# Patient Record
Sex: MALE | Race: AMERICAN INDIAN OR ALASKA NATIVE
[De-identification: names, ages, dates, MRNs, and addresses within clinical notes are randomized per-mention and may not be internally consistent; named-entity substitution may affect disease eponyms.]

---

## 2017-09-07 NOTE — EMERGENCY DEPARTMENT REPORT
ED Lower Extremity HPI





- General


Chief Complaint: Extremity Injury, Lower


Stated Complaint: FOOT PAIN


Time Seen by Provider: 09/07/17 17:24


Source: patient, EMS


Mode of arrival: Wheelchair


Limitations: Physical Limitation





- History of Present Illness


Initial Comments: 


pt is a 68 y/o mail with hx of Asthma, CHF, COPD, DMII, and HIV pos who 

presents for left ankle pain and swelling s/p glf 3 days ago pt endorses unable 

to ambulate , pt does indorse hx of gout, pt advises that he call the ambulance 

to hospital today because he can no longer walk on ankle, pain is 8/10 aching 

throbbing tingling. 





MD Complaint: ankle injury


Onset/Timing: 3


-: days(s)


Injury: Leg: Left (left lateral lower leg pain swelling no ecchymosis no 

stepoff no ), Ankle: Left (left lateral ankle pain and swelling )


Type of Injury: other (fall Ground Level )


Place: home


Severity: moderate


Severity scale (0 -10): 7


Improves With: nothing


Worsens With: weight bearing, movement, palpation


Context: fall


Associated Symptoms: swelling, tingling, unable to bear weight





- Related Data


 Previous Rx's











 Medication  Instructions  Recorded  Last Taken  Type


 


Aspirin [Aspirin TAB] 325 mg PO QDAY #30 tablet 05/23/14 Unknown Rx


 


metFORMIN [Glucophage] 500 mg PO DAILY #30 tablet 05/23/14 Unknown Rx


 


Acetaminophen/Codeine [Tylenol 1 tab PO Q6H PRN #30 tab 09/07/17 Unknown Rx





/Codeine # 3 tab]    











 Allergies











Allergy/AdvReac Type Severity Reaction Status Date / Time


 


No Known Allergies Allergy   Verified 09/07/17 15:02














ED Review of Systems


ROS: 


Stated complaint: FOOT PAIN


Other details as noted in HPI





Constitutional: denies: chills, fever


Eyes: denies: eye pain, eye discharge, vision change


ENT: denies: ear pain, throat pain


Respiratory: denies: cough, shortness of breath, wheezing


Cardiovascular: denies: chest pain, palpitations


Endocrine: no symptoms reported


Gastrointestinal: denies: abdominal pain, nausea, diarrhea


Genitourinary: denies: urgency, dysuria


Musculoskeletal: joint swelling, arthralgia.  denies: back pain, myalgia


Skin: denies: rash, lesions


Neurological: denies: headache, weakness, paresthesias


Psychiatric: denies: anxiety, depression


Hematological/Lymphatic: denies: easy bleeding, easy bruising





ED Past Medical Hx





- Past Medical History


Hx Congestive Heart Failure: No


Hx Diabetes: Yes


Hx Asthma: No


Hx COPD: No


Hx HIV: No





- Social History


Smoking Status: Never Smoker


Substance Use Type: None





- Medications


Home Medications: 


 Home Medications











 Medication  Instructions  Recorded  Confirmed  Last Taken  Type


 


Aspirin [Aspirin TAB] 325 mg PO QDAY #30 tablet 05/23/14  Unknown Rx


 


metFORMIN [Glucophage] 500 mg PO DAILY #30 tablet 05/23/14  Unknown Rx


 


Acetaminophen/Codeine [Tylenol 1 tab PO Q6H PRN #30 tab 09/07/17  Unknown Rx





/Codeine # 3 tab]     














ED Physical Exam





- General


Limitations: Physical Limitation


General appearance: alert, in no apparent distress





- Head


Head exam: Present: atraumatic, normocephalic





- Eye


Eye exam: Present: normal appearance, PERRL, EOMI





- ENT


ENT exam: Present: mucous membranes moist





- Neck


Neck exam: Present: normal inspection





- Respiratory


Respiratory exam: Present: normal lung sounds bilaterally.  Absent: respiratory 

distress





- Cardiovascular


Cardiovascular Exam: Present: regular rate, normal rhythm.  Absent: systolic 

murmur, diastolic murmur, rubs, gallop





- GI/Abdominal


GI/Abdominal exam: Present: soft, normal bowel sounds





- Rectal


Rectal exam: Present: deferred





- Extremities Exam


Extremities exam: Present: normal inspection, tenderness, normal capillary 

refill.  Absent: calf tenderness





- Expanded Lower Extremity Exam


  ** Left


Hip exam: Present: normal inspection, full ROM


Upper Leg exam: Present: normal inspection, full ROM


Knee exam: Present: normal inspection, full ROM


Lower Leg exam: Present: tenderness, swelling.  Absent: abrasion, laceration, 

ecchymosis, deformity, crepidus, dislocation, erythema, palpable cord, Wesley's 

sign


Ankle exam: Present: tenderness, swelling, abrasion.  Absent: laceration, 

ecchymosis, deformity, crepidus, dislocation, erythema, anterior draw sign


Foot/Toe exam: Present: normal inspection


Neuro vascular tendon exam: Present: no vascular compromise.  Absent: pulse 

deficit, abnormal cap refill, motor deficit, sensory deficit, tendon deficit, 

extremity cold to touch, pallor, abnormal 2-point discrimination, decreased fine

/light touch, foot drop, peroneal nerve deficit, significant pain with passive 

ROM of distal joint


Gait: Positive: unable to bear weight





- Back Exam


Back exam: Present: normal inspection, full ROM.  Absent: tenderness, CVA 

tenderness (R), CVA tenderness (L), muscle spasm, paraspinal tenderness, 

vertebral tenderness, rash noted





- Neurological Exam


Neurological exam: Present: alert, oriented X3, CN II-XII intact, reflexes 

normal





- Psychiatric


Psychiatric exam: Present: normal affect, normal mood





- Skin


Skin exam: Present: warm, dry, intact, normal color.  Absent: rash





ED Course


 Vital Signs











  09/07/17 09/07/17





  15:02 17:59


 


Temperature 98.6 F 


 


Pulse Rate 99 H 


 


Respiratory 20 18





Rate  


 


Blood Pressure 166/103 


 


O2 Sat by Pulse 100 





Oximetry  














ED Lower Extremity MDM





- Medical Decision Making


pt is a 68 y/o mail with hx of Asthma, CHF, COPD, DMII, and HIV pos who 

presents for left ankle pain and swelling s/p glf 3 days ago pt endorses unable 

to ambulate , pt does indorse hx of gout, pt advises that he call the ambulance 

to hospital today because he can no longer walk on ankle, pain is 8/10 aching 

throbbing tingling. exam: left lateral lower leg mild swelling no ecchmosis no 

deformity, pt unable to bear weight PPEPB+2, CRt <2 sec, negative homans, xray 

: closed nondisplaced fibula shaft fracture , plan: tatiana short leg left, 

crutches and crutch teacing pt will follow up with [facility]. [facility] will 

call tomorrow to set appointment, pt verbalized agreement and understanding 

with same.  pt did presents with htn episode today advises that he has not had 

bp medication today pt advised to take all medications as prescribed and to 

take medication upon arrival to home, pt verbalized agreement and understanding 

of same.  pt denies headache no dizziness no lightheadedness no cp no sob,  

post splint check: post splint check spacing appropriate 2 finger check, CRT <3 

sec, pt demonstrate understanding and safety with crutches will arrange 

nonemergent transport to home, for assistance with getting into residence.  pt 

has family to assist with follow up appointment. pt is a/ox 3 with nad at this 

time.      





Critical care attestation.: 


If time is entered above; I have spent that time in minutes in the direct care 

of this critically ill patient, excluding procedure time.








ED Disposition


Clinical Impression: 


Closed nondisp spiral fracture of shaft of fibula with routine healing


Qualifiers:


 Laterality: left Qualified Code(s): S82.445D - Nondisplaced spiral fracture of 

shaft of left fibula, subsequent encounter for closed fracture with routine 

healing





Disposition: DC-01 TO HOME OR SELFCARE


Is pt being admited?: No


Does the pt Need Aspirin: No


Condition: Good


Instructions:  Leg Fracture (ED)


Prescriptions: 


Acetaminophen/Codeine [Tylenol /Codeine # 3 tab] 1 tab PO Q6H PRN #30 tab


 PRN Reason: Pain


Referrals: 


PRIMARY CARE,MD [Primary Care Provider] - 3-5 Days


RAFAEL MCDONALD MD [Staff Physician] - 3-5 Days


Time of Disposition: 21:32

## 2017-09-08 NOTE — XRAY REPORT
LEFT ANKLE RADIOGRAPHS



INDICATION: Left ankle pain, swelling.  Status post fall.



COMPARISON: None similar.



FINDINGS: AP, lateral and oblique left ankle radiographs demonstrate 

distal fibular shaft spiral fracture, approximately 5 cm proximal to 

the lateral malleolus with minimal, approximately 1 mm cortical offset. 

 Intact ankle mortise, though comminuted fracture lucencies involve the 

medial malleolus, most prominent horizontal with approximately 3 mm 

thickness.  Diffuse ankle soft tissue swelling noted, medial more than 

lateral and also extending to the foot as also the lower leg.  Normal 

talar dome contour.  Approximately 5 mm plantar calcaneal spur.  Slight 

atherosclerotic calcifications.  Osteopenia not excluded.



CONCLUSION: Left distal fibular shaft spiral and medial malleolar acute 

fractures noted with diffuse soft tissue swelling and other findings, 

as described.  Intact ankle mortise.  Please correlate.



Thank you for the opportunity to participate in this patient's care.

## 2017-09-14 NOTE — EMERGENCY DEPARTMENT REPORT
HPI





- General


Chief Complaint: Extremity Injury, Lower


Time Seen by Provider: 09/14/17 22:15





- HPI


HPI: 





Room 9





Patient is a 67-year-old male presenting with a chief complaint left ankle 

pain.  The patient sustained a left distal fibula and medial malleolus fracture 

09/08/2017.  The patient was placed on a splint and given a referral to 

orthopedic surgery.  The patient states today while reaching above his head he 

lost his footing and slipped and fell again injuring his left lower extremity.  

The patient states the splint had gotten wet and so he removed it.  Patient 

denies any other complaints.  The patient currently gets his pain a score of 4/

10





Location: Left lower extremity


Duration: [see above]


Quality: Pain


Severity: 4/10


Modifying factors: [see above]


Context: [see above]


Mode of transportation: Unknown





ED Past Medical Hx





- Past Medical History


Previous Medical History?: Yes


Hx Diabetes: Yes





- Surgical History


Past Surgical History?: No





- Family History


Family history: no significant





- Social History


Smoking Status: Never Smoker


Substance Use Type: None





- Medications


Home Medications: 


 Home Medications











 Medication  Instructions  Recorded  Confirmed  Last Taken  Type


 


Aspirin [Aspirin TAB] 325 mg PO QDAY #30 tablet 05/23/14  Unknown Rx


 


metFORMIN [Glucophage] 500 mg PO DAILY #30 tablet 05/23/14  Unknown Rx


 


Acetaminophen/Codeine [Tylenol 1 tab PO Q6H PRN #30 tab 09/07/17  Unknown Rx





/Codeine # 3 tab]     


 


HYDROcodone/APAP 5-325 [Vale 1 - 2 each PO Q6HR PRN #20 tablet 09/14/17  

Unknown Rx





5/325]     














ED Review of Systems


ROS: 


Stated complaint: PAIN TO LEFT LEG


Other details as noted in HPI





Comment: All other systems reviewed and negative


Constitutional: denies: chills, fever


Eyes: denies: eye pain, eye discharge, vision change


ENT: denies: ear pain, throat pain


Respiratory: denies: cough, shortness of breath, wheezing


Cardiovascular: denies: chest pain, palpitations


Endocrine: no symptoms reported


Gastrointestinal: denies: abdominal pain, nausea, diarrhea


Genitourinary: denies: urgency, dysuria


Musculoskeletal: arthralgia


Skin: denies: rash, lesions


Neurological: denies: headache, weakness, paresthesias


Psychiatric: denies: anxiety, depression


Hematological/Lymphatic: denies: easy bleeding, easy bruising





Physical Exam





- Physical Exam


Vital Signs: 


 Vital Signs











  09/14/17





  18:51


 


Temperature 97.7 F


 


Pulse Rate 99 H


 


Respiratory 18





Rate 


 


Blood Pressure 175/103


 


O2 Sat by Pulse 97





Oximetry 











Physical Exam: 





GENERAL: The patient is well-developed well-nourished male lying on stretcher 

not appearing to be in acute distress. []


HEENT: Normocephalic.  Atraumatic. 


NECK: Supple.  Trachea midline


CHEST/LUNGS: Clear to auscultation.  There is no respiratory distress noted.


HEART/CARDIOVASCULAR: Regular.  There is no tachycardia.  There is no gallop 

rub or murmur.


ABDOMEN: Abdomen is soft, nontender.  Patient has normal bowel sounds.  There 

is no abdominal distention.


SKIN: There is no rash.   There is no diaphoresis.


NEURO: The patient is awake, alert, and oriented.  The patient is cooperative.  

The patient has normal speech


MUSCULOSKELETAL: There is tenderness to palpation of the left ankle.





ED Course


 Vital Signs











  09/14/17





  18:51


 


Temperature 97.7 F


 


Pulse Rate 99 H


 


Respiratory 18





Rate 


 


Blood Pressure 175/103


 


O2 Sat by Pulse 97





Oximetry 














ED Medical Decision Making





- Lab Data


Result diagrams: 


 09/14/17 19:23





 09/14/17 19:23





- Radiology Data


Radiology results: image reviewed (left ankle x-ray)


interpreted by me: 





Left ankle x-ray-distal fibula spiral fracture, medial malleolus fracture





- Differential Diagnosis


ankle fracture


Critical care attestation.: 


If time is entered above; I have spent that time in minutes in the direct care 

of this critically ill patient, excluding procedure time.








ED Disposition


Clinical Impression: 


 Closed fracture of left distal fibula, Fracture of ankle, medial malleolus, 

left, closed





Disposition: DC-01 TO HOME OR SELFCARE


Is pt being admited?: No


Does the pt Need Aspirin: No


Condition: Stable


Instructions:  Ankle Fracture (ED)


Additional Instructions: 


Return to the emergency department immediately should you develop worsening 

symptoms, fever, inability to tolerate food or liquid or any other concerns.


Prescriptions: 


HYDROcodone/APAP 5-325 [Norco 5/325] 1 - 2 each PO Q6HR PRN #20 tablet


 PRN Reason: Pain


Referrals: 


RAFAEL RG MD [Staff Physician] - [facility]


Time of Disposition: 22:40

## 2017-09-14 NOTE — XRAY REPORT
FINAL REPORT



PROCEDURE:  XR ANKLE 2V LT



TECHNIQUE:  LEFT ankle radiographs, AP, lateral,  







HISTORY:  ankle pain post fall 



COMPARISON:  No prior studies are available for comparison.



FINDINGS:  

Fracture (s) and/or Dislocation(s): Distal tibia fracture of the

medial malleolus. Oblique fracture of the distal fibula.



Alignment: Mild widening of the ankle mortise.



Joint space(s): Normal.



Soft tissues: Swelling.



Bone mineralization: Normal.



Foreign bodies: None.



Calcaneal spurring: None.







IMPRESSION:  

Distal tibia and fibula fractures..

## 2018-08-23 ENCOUNTER — HOSPITAL ENCOUNTER (EMERGENCY)
Dept: HOSPITAL 5 - ED | Age: 68
End: 2018-08-23
Payer: MEDICARE

## 2018-08-23 DIAGNOSIS — I46.9: Primary | ICD-10-CM

## 2018-08-23 DIAGNOSIS — E11.9: ICD-10-CM

## 2018-08-23 DIAGNOSIS — Z79.82: ICD-10-CM

## 2018-08-23 PROCEDURE — 99285 EMERGENCY DEPT VISIT HI MDM: CPT

## 2018-08-23 PROCEDURE — 92950 HEART/LUNG RESUSCITATION CPR: CPT

## 2018-08-23 NOTE — EMERGENCY DEPARTMENT REPORT
ED CPR HPI





- General


Chief Complaint: Cardiac Arrest/CPR


Stated Complaint: STEMI


Time Seen by Provider: 18 19:02


Source: EMS (verbal report received from EMS.ems notes not available at time of

  chart dictation)


Mode of arrival: Stretcher


Limitations: Altered Mental Status, Physical Limitation





- History of Present Illness


Initial Comments: 





This is a 68-year-old gentleman who was brought to the hospital by EMS as out 

of hospital cardiac arrest.  His last known well time is not known.  EMS 

reports that they arrived at the patient's home, and he was not breathing and 

he was pulseless.  He received bag valve mask ventilation.  He received 

standard ACLS interventions.  Upon arrival to the ER, the patient is pulseless, 

apneic, and does not have a shockable rhythm.  He received standard ACLS 

interventions.  He receives high quality CPR.  Unfortunately, return of 

spontaneous circulation could not be obtained.  Resuscitation efforts were 

terminated secondary to medical futility.


MD Complaint: found unresponsive


-: unknown


Initial Findings in the Field: no pulse


Treatments Prior to Arrival: BMV, chest compressions, epinephrine mgs #





- Related Data


 Previous Rx's











 Medication  Instructions  Recorded  Last Taken  Type


 


Aspirin [Aspirin TAB] 325 mg PO QDAY #30 tablet 14 Unknown Rx


 


Lisinopril [Zestril TAB] 10 mg PO QDAY #30 tablet 17 Unknown Rx


 


metFORMIN [Glucophage] 500 mg PO BID #30 tablet 17 Unknown Rx


 


oxyCODONE /ACETAMINOPHEN [Percocet 1 tab PO TID PRN #21 tablet 17 Unknown 

Rx





5/325 mg]    











 Allergies











Allergy/AdvReac Type Severity Reaction Status Date / Time


 


No Known Allergies Allergy   Verified 17 11:36














ED Review of Systems


ROS: 


Stated complaint: STEMI


Other details as noted in HPI





Comment: Unobtainable due to pts medical conditions





ED Past Medical Hx





- Past Medical History


Hx Congestive Heart Failure: No


Hx Diabetes: Yes


Hx Asthma: No (see note below)


Hx COPD: No


Hx HIV: No





- Social History


Smoking Status: Never Smoker





- Medications


Home Medications: 


 Home Medications











 Medication  Instructions  Recorded  Confirmed  Last Taken  Type


 


Aspirin [Aspirin TAB] 325 mg PO QDAY #30 tablet 14 17 Unknown Rx


 


Lisinopril [Zestril TAB] 10 mg PO QDAY #30 tablet 17  Unknown Rx


 


metFORMIN [Glucophage] 500 mg PO BID #30 tablet 17  Unknown Rx


 


oxyCODONE /ACETAMINOPHEN [Percocet 1 tab PO TID PRN #21 tablet 17  

Unknown Rx





5/325 mg]     














ED Physical Exam





- General


Limitations: Altered Mental Status, Physical Limitation


General appearance: other (nonverbal, GCS of 3, receiving bag valve mask 

ventilation)





- Head


Head exam: Present: atraumatic, normocephalic





- Eye


Eye exam: Present: other (pupils are fixed and dilated)





- ENT


ENT exam: Present: other (copious vomitus material noted around the oropharynx)





- Neck


Neck exam: Present: normal inspection





- Respiratory


Respiratory exam: Present: other (no breath sounds are noted)





- Cardiovascular


Cardiovascular Exam: Present: other (patient is pulseless)





- GI/Abdominal


GI/Abdominal exam: Present: distended





- 


 exam: Present: normal inspection


External exam: Present: normal external exam





- Extremities Exam


Extremities exam: Present: normal inspection (patient has a intraosseous IV 

noted in the left lower extremity)





- Back Exam


Back exam: Present: normal inspection





- Neurological Exam


Neurological exam: Present: other (nonverbal, GCS of 3, receiving bag valve 

mask ventilation)





- Psychiatric


Psychiatric exam: Present: other (patient is nonverbal)





- Skin


Skin exam: Present: dry





ED Medical Decision Making





- Medical Decision Making





Differential diagnosis, including but not limited to: Acute coronary syndrome, 

intracranial hemorrhage, pulmonary embolus, sepsis, hemorrhagic shock death


Critical care attestation.: 


If time is entered above; I have spent that time in minutes in the direct care 

of this critically ill patient, excluding procedure time.








ED Disposition


Clinical Impression: 


 Cardiac arrest





Disposition: DC-20 


Is pt being admited?: No


Does the pt Need Aspirin: No


Condition: Undetermined


Referrals: 


PRIMARY CARE,MD [Primary Care Provider] - 3-5 Days